# Patient Record
Sex: FEMALE | Race: WHITE | URBAN - METROPOLITAN AREA
[De-identification: names, ages, dates, MRNs, and addresses within clinical notes are randomized per-mention and may not be internally consistent; named-entity substitution may affect disease eponyms.]

---

## 2018-10-02 ENCOUNTER — HOSPITAL ENCOUNTER (OUTPATIENT)
Dept: EMERGENCY MEDICINE | Facility: HOSPITAL | Age: 53
End: 2018-10-03
Attending: INTERNAL MEDICINE | Admitting: INTERNAL MEDICINE

## 2018-10-02 LAB
ABS NEUT (OLG): 4.59 X10(3)/MCL (ref 2.1–9.2)
ALBUMIN SERPL-MCNC: 3.7 GM/DL (ref 3.4–5)
ALBUMIN/GLOB SERPL: 1 RATIO (ref 1–2)
ALP SERPL-CCNC: 88 UNIT/L (ref 45–117)
ALT SERPL-CCNC: 36 UNIT/L (ref 12–78)
APAP SERPL-MCNC: 47.5 MCG/ML (ref 10–30)
AST SERPL-CCNC: 18 UNIT/L (ref 15–37)
BASOPHILS # BLD AUTO: 0.02 X10(3)/MCL
BASOPHILS NFR BLD AUTO: 0 %
BILIRUB SERPL-MCNC: 0.4 MG/DL (ref 0.2–1)
BILIRUBIN DIRECT+TOT PNL SERPL-MCNC: <0.1 MG/DL
BILIRUBIN DIRECT+TOT PNL SERPL-MCNC: ABNORMAL MG/DL
BUN SERPL-MCNC: 10 MG/DL (ref 7–18)
CALCIUM SERPL-MCNC: 8.5 MG/DL (ref 8.5–10.1)
CHLORIDE SERPL-SCNC: 110 MMOL/L (ref 98–107)
CK MB SERPL-MCNC: 1.7 NG/ML (ref 1–3.6)
CK SERPL-CCNC: 151 UNIT/L (ref 26–192)
CO2 SERPL-SCNC: 24 MMOL/L (ref 21–32)
CREAT SERPL-MCNC: 0.8 MG/DL (ref 0.6–1.3)
ERYTHROCYTE [DISTWIDTH] IN BLOOD BY AUTOMATED COUNT: 11.6 % (ref 11.5–14.5)
ETHANOL SERPL-MCNC: <3 MG/DL
GLOBULIN SER-MCNC: 3.3 GM/ML (ref 2.3–3.5)
GLUCOSE SERPL-MCNC: 95 MG/DL (ref 74–106)
HCT VFR BLD AUTO: 35.9 % (ref 35–46)
HGB BLD-MCNC: 12.6 GM/DL (ref 12–16)
IMM GRANULOCYTES # BLD AUTO: 0.02 10*3/UL
IMM GRANULOCYTES NFR BLD AUTO: 0 %
LIPASE SERPL-CCNC: 58 UNIT/L (ref 73–393)
LYMPHOCYTES # BLD AUTO: 3.14 X10(3)/MCL
LYMPHOCYTES NFR BLD AUTO: 38 % (ref 13–40)
MCH RBC QN AUTO: 32.5 PG (ref 26–34)
MCHC RBC AUTO-ENTMCNC: 35.1 GM/DL (ref 31–37)
MCV RBC AUTO: 92.5 FL (ref 80–100)
MONOCYTES # BLD AUTO: 0.48 X10(3)/MCL
MONOCYTES NFR BLD AUTO: 6 % (ref 4–12)
NEUTROPHILS # BLD AUTO: 4.59 X10(3)/MCL
NEUTROPHILS NFR BLD AUTO: 56 X10(3)/MCL
PLATELET # BLD AUTO: 224 X10(3)/MCL (ref 130–400)
PMV BLD AUTO: 10.9 FL (ref 7.4–10.4)
POC TROPONIN: 0 NG/ML (ref 0–0.08)
POTASSIUM SERPL-SCNC: 3 MMOL/L (ref 3.5–5.1)
PROT SERPL-MCNC: 7 GM/DL (ref 6.4–8.2)
RBC # BLD AUTO: 3.88 X10(6)/MCL (ref 4–5.2)
SALICYLATES SERPL-MCNC: 3.1 MG/DL (ref 2.8–20)
SODIUM SERPL-SCNC: 143 MMOL/L (ref 136–145)
TSH SERPL-ACNC: 1.25 MIU/L (ref 0.36–3.74)
WBC # SPEC AUTO: 8.2 X10(3)/MCL (ref 4.5–11)

## 2018-10-03 LAB
ABS NEUT (OLG): 3.04 X10(3)/MCL
ALBUMIN SERPL-MCNC: 2.7 GM/DL (ref 3.4–5)
ALBUMIN/GLOB SERPL: 1 RATIO (ref 1–2)
ALP SERPL-CCNC: 68 UNIT/L (ref 45–117)
ALT SERPL-CCNC: 37 UNIT/L (ref 12–78)
AMPHET UR QL SCN: POSITIVE
APAP SERPL-MCNC: 16.8 MCG/ML (ref 10–30)
APAP SERPL-MCNC: 3.1 MCG/ML (ref 10–30)
APPEARANCE, UA: CLEAR
AST SERPL-CCNC: 15 UNIT/L (ref 15–37)
BACTERIA #/AREA URNS AUTO: ABNORMAL /[HPF]
BARBITURATE SCN PRESENT UR: POSITIVE
BASOPHILS NFR BLD MANUAL: 0 %
BENZODIAZ UR QL SCN: NEGATIVE
BILIRUB SERPL-MCNC: 0.2 MG/DL (ref 0.2–1)
BILIRUB UR QL STRIP: NEGATIVE
BILIRUBIN DIRECT+TOT PNL SERPL-MCNC: <0.1 MG/DL
BILIRUBIN DIRECT+TOT PNL SERPL-MCNC: ABNORMAL MG/DL
BUN SERPL-MCNC: 8 MG/DL (ref 7–18)
CALCIUM SERPL-MCNC: 7.4 MG/DL (ref 8.5–10.1)
CANNABINOIDS UR QL SCN: NEGATIVE
CHLORIDE SERPL-SCNC: 112 MMOL/L (ref 98–107)
CO2 SERPL-SCNC: 22 MMOL/L (ref 21–32)
COCAINE UR QL SCN: NEGATIVE
COLOR UR: ABNORMAL
CREAT SERPL-MCNC: 0.7 MG/DL (ref 0.6–1.3)
EOSINOPHIL NFR BLD MANUAL: 0 %
ERYTHROCYTE [DISTWIDTH] IN BLOOD BY AUTOMATED COUNT: 11.7 % (ref 11.5–14.5)
GLOBULIN SER-MCNC: 2.9 GM/ML (ref 2.3–3.5)
GLUCOSE (UA): NORMAL
GLUCOSE SERPL-MCNC: 90 MG/DL (ref 74–106)
GRANULOCYTES NFR BLD MANUAL: 39 % (ref 43–75)
HCT VFR BLD AUTO: 31.8 % (ref 35–46)
HGB BLD-MCNC: 11.1 GM/DL (ref 12–16)
HGB UR QL STRIP: NEGATIVE
HYALINE CASTS #/AREA URNS LPF: ABNORMAL /[LPF]
KETONES UR QL STRIP: NEGATIVE
LEUKOCYTE ESTERASE UR QL STRIP: 75 LEU/UL
LYMPHOCYTES NFR BLD MANUAL: 3 %
LYMPHOCYTES NFR BLD MANUAL: 52 % (ref 20.5–51.1)
MCH RBC QN AUTO: 32.5 PG (ref 26–34)
MCHC RBC AUTO-ENTMCNC: 34.9 GM/DL (ref 31–37)
MCV RBC AUTO: 93 FL (ref 80–100)
MONOCYTES NFR BLD MANUAL: 1 % (ref 2–9)
NEUTS BAND NFR BLD MANUAL: 5 % (ref 0–10)
NITRITE UR QL STRIP: ABNORMAL
OPIATES UR QL SCN: NEGATIVE
PCP UR QL: NEGATIVE
PH UR STRIP.AUTO: 6 [PH] (ref 5–8)
PH UR STRIP: 6 [PH] (ref 4.5–8)
PLATELET # BLD AUTO: 194 X10(3)/MCL (ref 130–400)
PLATELET # BLD EST: ADEQUATE 10*3/UL
PMV BLD AUTO: 10.9 FL (ref 7.4–10.4)
POTASSIUM SERPL-SCNC: 3.1 MMOL/L (ref 3.5–5.1)
PROT SERPL-MCNC: 5.6 GM/DL (ref 6.4–8.2)
PROT UR QL STRIP: NEGATIVE
RBC # BLD AUTO: 3.42 X10(6)/MCL (ref 4–5.2)
RBC #/AREA URNS AUTO: ABNORMAL /[HPF]
RBC MORPH BLD: NORMAL
SODIUM SERPL-SCNC: 144 MMOL/L (ref 136–145)
SP GR UR STRIP: 1.02 (ref 1–1.03)
SQUAMOUS #/AREA URNS LPF: ABNORMAL /[LPF]
TEMPERATURE, URINE (OHS): 25 DEGC (ref 20–25)
UROBILINOGEN UR STRIP-ACNC: NORMAL
WBC # SPEC AUTO: 7.5 X10(3)/MCL (ref 4.5–11)
WBC #/AREA URNS AUTO: ABNORMAL /HPF

## 2019-02-06 LAB
ABS NEUT (OLG): 4.53 X10(3)/MCL (ref 2.1–9.2)
BASOPHILS # BLD AUTO: 0 X10(3)/MCL (ref 0–0.2)
BASOPHILS NFR BLD AUTO: 0 %
BUN SERPL-MCNC: 10 MG/DL (ref 7–18)
CALCIUM SERPL-MCNC: 9.2 MG/DL (ref 8.5–10.1)
CHLORIDE SERPL-SCNC: 108 MMOL/L (ref 98–107)
CO2 SERPL-SCNC: 29 MMOL/L (ref 21–32)
CREAT SERPL-MCNC: 0.72 MG/DL (ref 0.55–1.02)
CREAT/UREA NIT SERPL: 13.9
ERYTHROCYTE [DISTWIDTH] IN BLOOD BY AUTOMATED COUNT: 11.9 % (ref 11.5–17)
GLUCOSE SERPL-MCNC: 57 MG/DL (ref 74–106)
HCT VFR BLD AUTO: 40.2 % (ref 37–47)
HGB BLD-MCNC: 13.4 GM/DL (ref 12–16)
LYMPHOCYTES # BLD AUTO: 2.8 X10(3)/MCL (ref 0.6–4.6)
LYMPHOCYTES NFR BLD AUTO: 35 %
MCH RBC QN AUTO: 32.1 PG (ref 27–31)
MCHC RBC AUTO-ENTMCNC: 33.3 GM/DL (ref 33–36)
MCV RBC AUTO: 96.4 FL (ref 80–94)
MONOCYTES # BLD AUTO: 0.6 X10(3)/MCL (ref 0.1–1.3)
MONOCYTES NFR BLD AUTO: 8 %
NEUTROPHILS # BLD AUTO: 4.53 X10(3)/MCL (ref 2.1–9.2)
NEUTROPHILS NFR BLD AUTO: 57 %
PLATELET # BLD AUTO: 234 X10(3)/MCL (ref 130–400)
PMV BLD AUTO: 11.5 FL (ref 9.4–12.4)
POTASSIUM SERPL-SCNC: 4.8 MMOL/L (ref 3.5–5.1)
RBC # BLD AUTO: 4.17 X10(6)/MCL (ref 4.2–5.4)
SODIUM SERPL-SCNC: 143 MMOL/L (ref 136–145)
WBC # SPEC AUTO: 8 X10(3)/MCL (ref 4.5–11.5)

## 2019-02-08 ENCOUNTER — HISTORICAL (OUTPATIENT)
Dept: ADMINISTRATIVE | Facility: HOSPITAL | Age: 54
End: 2019-02-08

## 2020-02-09 ENCOUNTER — HOSPITAL ENCOUNTER (OUTPATIENT)
Dept: MEDSURG UNIT | Facility: HOSPITAL | Age: 55
End: 2020-02-12
Attending: INTERNAL MEDICINE | Admitting: INTERNAL MEDICINE

## 2020-02-10 LAB
ABS NEUT (OLG): 8.48 X10(3)/MCL (ref 2.1–9.2)
ALBUMIN SERPL-MCNC: 3.6 GM/DL (ref 3.4–5)
ALBUMIN/GLOB SERPL: 1.1 RATIO (ref 1.1–2)
ALP SERPL-CCNC: 113 UNIT/L (ref 38–126)
ALT SERPL-CCNC: 26 UNIT/L (ref 12–78)
AST SERPL-CCNC: 27 UNIT/L (ref 15–37)
BASOPHILS # BLD AUTO: 0 X10(3)/MCL (ref 0–0.2)
BASOPHILS NFR BLD AUTO: 0 %
BILIRUB SERPL-MCNC: 0.3 MG/DL (ref 0.2–1)
BILIRUBIN DIRECT+TOT PNL SERPL-MCNC: 0.1 MG/DL (ref 0–0.5)
BILIRUBIN DIRECT+TOT PNL SERPL-MCNC: 0.2 MG/DL (ref 0–0.8)
BUN SERPL-MCNC: 11 MG/DL (ref 7–18)
CALCIUM SERPL-MCNC: 8.7 MG/DL (ref 8.5–10.1)
CHLORIDE SERPL-SCNC: 105 MMOL/L (ref 98–107)
CO2 SERPL-SCNC: 28 MMOL/L (ref 21–32)
CREAT SERPL-MCNC: 0.58 MG/DL (ref 0.55–1.02)
ERYTHROCYTE [DISTWIDTH] IN BLOOD BY AUTOMATED COUNT: 12.9 % (ref 11.5–17)
GLOBULIN SER-MCNC: 3.2 GM/DL (ref 2.4–3.5)
GLUCOSE SERPL-MCNC: 74 MG/DL (ref 74–106)
GRAM STN SPEC: NORMAL
HAV IGM SERPL QL IA: NEGATIVE
HBV CORE IGM SERPL QL IA: NEGATIVE
HBV SURFACE AG SERPL QL IA: NEGATIVE
HCT VFR BLD AUTO: 36.6 % (ref 37–47)
HCV AB SERPL QL IA: POSITIVE
HEPATITIS PANEL INTERP: ABNORMAL
HGB BLD-MCNC: 12.3 GM/DL (ref 12–16)
HIV 1+2 AB+HIV1 P24 AG SERPL QL IA: NEGATIVE
LYMPHOCYTES # BLD AUTO: 2.4 X10(3)/MCL (ref 0.6–4.6)
LYMPHOCYTES NFR BLD AUTO: 20 %
MCH RBC QN AUTO: 31.3 PG (ref 27–31)
MCHC RBC AUTO-ENTMCNC: 33.6 GM/DL (ref 33–36)
MCV RBC AUTO: 93.1 FL (ref 80–94)
MONOCYTES # BLD AUTO: 1 X10(3)/MCL (ref 0.1–1.3)
MONOCYTES NFR BLD AUTO: 8 %
NEUTROPHILS # BLD AUTO: 8.48 X10(3)/MCL (ref 2.1–9.2)
NEUTROPHILS NFR BLD AUTO: 71 %
PLATELET # BLD AUTO: 233 X10(3)/MCL (ref 130–400)
PMV BLD AUTO: 10.2 FL (ref 9.4–12.4)
POTASSIUM SERPL-SCNC: 3.7 MMOL/L (ref 3.5–5.1)
PROT SERPL-MCNC: 6.8 GM/DL (ref 6.4–8.2)
RBC # BLD AUTO: 3.93 X10(6)/MCL (ref 4.2–5.4)
SODIUM SERPL-SCNC: 139 MMOL/L (ref 136–145)
WBC # SPEC AUTO: 11.9 X10(3)/MCL (ref 4.5–11.5)

## 2020-02-11 LAB
ABS NEUT (OLG): 6.73 X10(3)/MCL (ref 2.1–9.2)
ALBUMIN SERPL-MCNC: 2.9 GM/DL (ref 3.4–5)
ALBUMIN/GLOB SERPL: 1.2 {RATIO}
ALP SERPL-CCNC: 173 UNIT/L (ref 38–126)
ALT SERPL-CCNC: 246 UNIT/L (ref 12–78)
AMPHET UR QL SCN: POSITIVE
APPEARANCE, UA: CLEAR
AST SERPL-CCNC: 193 UNIT/L (ref 15–37)
BACTERIA SPEC CULT: ABNORMAL /HPF
BARBITURATE SCN PRESENT UR: POSITIVE
BASOPHILS # BLD AUTO: 0 X10(3)/MCL (ref 0–0.2)
BASOPHILS NFR BLD AUTO: 0 %
BENZODIAZ UR QL SCN: NEGATIVE
BILIRUB SERPL-MCNC: 0.5 MG/DL (ref 0.2–1)
BILIRUB UR QL STRIP: NEGATIVE
BILIRUBIN DIRECT+TOT PNL SERPL-MCNC: 0.1 MG/DL (ref 0–0.2)
BILIRUBIN DIRECT+TOT PNL SERPL-MCNC: 0.4 MG/DL (ref 0–0.8)
BUN SERPL-MCNC: 6 MG/DL (ref 7–18)
CALCIUM SERPL-MCNC: 8.1 MG/DL (ref 8.5–10.1)
CANNABINOIDS UR QL SCN: NEGATIVE
CHLORIDE SERPL-SCNC: 108 MMOL/L (ref 98–107)
CO2 SERPL-SCNC: 29 MMOL/L (ref 21–32)
COCAINE UR QL SCN: NEGATIVE
COLOR UR: YELLOW
CREAT SERPL-MCNC: 0.52 MG/DL (ref 0.55–1.02)
ERYTHROCYTE [DISTWIDTH] IN BLOOD BY AUTOMATED COUNT: 13.2 % (ref 11.5–17)
GLOBULIN SER-MCNC: 2.5 GM/DL (ref 2.4–3.5)
GLUCOSE (UA): NEGATIVE
GLUCOSE SERPL-MCNC: 108 MG/DL (ref 74–106)
HCT VFR BLD AUTO: 35.2 % (ref 37–47)
HGB BLD-MCNC: 11.6 GM/DL (ref 12–16)
HGB UR QL STRIP: ABNORMAL
KETONES UR QL STRIP: NEGATIVE
LEUKOCYTE ESTERASE UR QL STRIP: ABNORMAL
LYMPHOCYTES # BLD AUTO: 1.5 X10(3)/MCL (ref 0.6–4.6)
LYMPHOCYTES NFR BLD AUTO: 17 %
MCH RBC QN AUTO: 31.4 PG (ref 27–31)
MCHC RBC AUTO-ENTMCNC: 33 GM/DL (ref 33–36)
MCV RBC AUTO: 95.4 FL (ref 80–94)
MONOCYTES # BLD AUTO: 0.5 X10(3)/MCL (ref 0.1–1.3)
MONOCYTES NFR BLD AUTO: 6 %
NEUTROPHILS # BLD AUTO: 6.73 X10(3)/MCL (ref 2.1–9.2)
NEUTROPHILS NFR BLD AUTO: 77 %
NITRITE UR QL STRIP: NEGATIVE
OPIATES UR QL SCN: POSITIVE
PCP UR QL: NEGATIVE
PH UR STRIP.AUTO: 6 [PH] (ref 5–7.5)
PH UR STRIP: 6 [PH] (ref 5–9)
PLATELET # BLD AUTO: 226 X10(3)/MCL (ref 130–400)
PMV BLD AUTO: 10.6 FL (ref 9.4–12.4)
POTASSIUM SERPL-SCNC: 3.4 MMOL/L (ref 3.5–5.1)
PROT SERPL-MCNC: 5.4 GM/DL (ref 6.4–8.2)
PROT UR QL STRIP: NEGATIVE
RBC # BLD AUTO: 3.69 X10(6)/MCL (ref 4.2–5.4)
RBC #/AREA URNS HPF: 29 /HPF (ref 0–2)
SODIUM SERPL-SCNC: 141 MMOL/L (ref 136–145)
SP GR FLD REFRACTOMETRY: 1.01 (ref 1–1.03)
SP GR UR STRIP: 1.01 (ref 1–1.03)
SQUAMOUS EPITHELIAL, UA: ABNORMAL
UROBILINOGEN UR STRIP-ACNC: 0.2
VANCOMYCIN TROUGH SERPL-MCNC: 8.9 MCG/ML (ref 12–20)
WBC # SPEC AUTO: 8.8 X10(3)/MCL (ref 4.5–11.5)
WBC #/AREA URNS HPF: 139 /HPF (ref 0–3)

## 2020-02-13 LAB
FINAL CULTURE: NO GROWTH
FINAL CULTURE: NORMAL

## 2020-02-15 LAB
FINAL CULTURE: NORMAL
FINAL CULTURE: NORMAL

## 2020-03-09 LAB — FINAL CULTURE: NORMAL

## 2021-08-04 ENCOUNTER — HISTORICAL (OUTPATIENT)
Dept: ADMINISTRATIVE | Facility: HOSPITAL | Age: 56
End: 2021-08-04

## 2022-04-10 ENCOUNTER — HISTORICAL (OUTPATIENT)
Dept: ADMINISTRATIVE | Facility: HOSPITAL | Age: 57
End: 2022-04-10

## 2022-04-29 VITALS
SYSTOLIC BLOOD PRESSURE: 126 MMHG | DIASTOLIC BLOOD PRESSURE: 83 MMHG | HEIGHT: 60 IN | WEIGHT: 105.63 LBS | BODY MASS INDEX: 20.74 KG/M2

## 2022-04-30 NOTE — OP NOTE
DATE OF SURGERY:    02/08/2019    SURGEON:  Oseas Saha MD    PREOPERATIVE DIAGNOSIS:  Neoplasm parotid.    POSTOPERATIVE DIAGNOSIS:  Neoplasm parotid.    PROCEDURE PERFORMED:  Lateral parotidectomy without nerve dissection.    ANESTHESIA:  General endotracheal.    ESTIMATED BLOOD LOSS:  17 mL.    INTRAOPERATIVE MEDICATIONS:  Ancef.    FINDINGS:  Enlarged, purulent lymph node in the posterior tail of parotid with friability and loss of normal architecture grossly.    INDICATIONS:  Mariana Rodriguez is a young lady who has a history of above-mentioned problem.  She understood the risks, benefits, and alternatives to the procedure and consented to it.    DESCRIPTION OF PROCEDURE:  She was brought to the operating room on 02/08, anesthesia was induced in a standard fashion with no complication.  The correct patient, procedure, and site was identified, confirmed with OR personnel.  The operative site was inspected, and the area was prepped and draped in the usual sterile fashion, was injected with 1% lidocaine with epinephrine, and the corner of the mouth was draped in the area so we could watch any activation untoward of the nerve.     The 15 blade was used to make an incision.  A standard modified Emir incision, and the sub SMAS and subplatysmal flaps were raised in a standard fashion.  The lesion was located and dissected free from the surrounding tissue.  Hemostasis was achieved with a bipolar.  The wound was then closed with deep 2-0     Vicryl suture and superficial 5-0 gut on the skin.  The patient was awakened and brought to recovery in good condition.        ______________________________  Oseas Saha MD    CG/UB  DD:  02/08/2019  Time:  08:11AM  DT:  02/08/2019  Time:  12:20PM  Job #:  [id number]

## 2022-04-30 NOTE — ED PROVIDER NOTES
Patient:   Mariana Rodriguez            MRN: [id number]            FIN: [id number]               Age:   52 years     Sex:  Female     :  1965   Associated Diagnoses:   Drug overdose, intentional; Suicidal ideations   Author:   Arjun Gong MD      Basic Information   Time seen: Date & time 10/2/2018 23:00:00.   History source: Patient.   Arrival mode: Private vehicle.   History limitation: None.   Additional information: Chief Complaint from Nursing Triage Note : Chief Complaint   10/2/2018 22:59 CDT      Chief Complaint           Pt. brought in by [facility] for AMS.  Pt. disoriented and confused.  [facility].  Pt. states that she took ambien. Denies any other medication.  (Modified) .      History of Present Illness   The patient presents with intentional overdose and suicide attempt.  The substance ingested was Ambien.  The onset was unknown.  The location where the incident occurred was at home.  The reason for ingestion was suicide attempt.  Risk factors consist of prior suicide attempt.  Prior episodes: occasional.  Therapy today: none.  Associated symptoms: confusion, denies nausea, denies vomiting, denies chest pain and denies shortness of breath.      52-year-old female brought in by ambulance for medical screening evaluation.  EMS reported the patient had been telling all of her neighbors that she was going to commit suicide.  Patient currently difficult to understand, only answering yes or no questions.  Throughout questioning, patient reports that she had taken an unknown amount of Ambien in a suicide attempt.  She denies taking any other medications.  Denies alcohol or drug use.  Currently awake and alert.  EMS reports that there was also an empty bottle of Tylenol that they found at her house.  Patient reports not taking this medication..        Review of Systems   Constitutional symptoms:  No fever, no chills.     Respiratory symptoms:  No shortness of breath, no orthopnea.    Cardiovascular symptoms:  No chest pain, no palpitations.    Gastrointestinal symptoms:  No abdominal pain, no nausea, no vomiting.    Neurologic symptoms:  No headache, no dizziness.              Additional review of systems information: All other systems reviewed and otherwise negative.      Health Status   Allergies:    Allergic Reactions (Selected)  Severity Not Documented  NSAIDs- No reactions were documented.  PROzac- No reactions were documented.  Toradol- No reactions were documented.,    Allergies (3) Active Reaction  NSAIDs None Documented  PROzac None Documented  Toradol None Documented  .   Medications:  (Selected)   Inpatient Medications  Ordered  IVF Normal Saline NS Bolus 1000ml: 1,000 mL, 1,000 mL, IV, 1,000 mL/hr, start date 10/02/18 22:59:00 CDT, stop date 10/02/18 22:59:00 CDT, STAT  Zofran 2 mg/mL injectable solution: 4 mg, form: Injection, IV Push, Now, first dose 10/02/18 23:00:00 CDT, stop date 10/02/18 23:00:00 CDT, STAT  Documented Medications  Documented  Ambien 10 mg oral tab -[facility]: = 1 tab(s), Oral, Once a day (at bedtime), PRN PRN as needed for insomnia, 0 Refill(s)  Amitriptyline Hcl 100mg Tab: 100 mg = 1 tab(s), Oral, Daily  BUT/APAP/CAF TAB: 1 tab(s), Oral, BID  [employer] ACETAMINOPHEN 325 MG TAB: 650 mg = 2 tab(s), Oral, Daily  Cyclobenzaprine Hcl 10mg Tab: 10 mg = 1 tab(s), Oral, TID  Dicyclomine Hcl 10mg Cap: 10 mg = 1 cap(s), Oral, BID  Fioricet oral capsule: 1 cap(s), Oral, q4hr, PRN PRN as needed, # 30 cap(s), 0 Refill(s)  Promethazine 25mg Tab: 25 mg = 1 tab(s), Oral, TID.      Past Medical/ Family/ Social History   Medical history: Reviewed as documented in chart.   Surgical history:    C SEC X 2.  Total abdominal hysterectomy (corpus and cervix), with or without removal of tube(s), with or without removal of ovary(s); (77330)., Reviewed as documented in chart.   Family history:    History is unknown., Reviewed as  documented in chart.   Social history: Reviewed as documented in chart.   Problem list:    Active Problems (5)  Benzodiazepine dependence   Dental decay   Gastric ulcer   Opiate withdrawal   Tobacco user   .      Physical Examination               Vital Signs   Vital Signs   10/2/2018 22:59 CDT      Temperature Oral          36.5 DegC                             Temperature Oral (calculated)             97.70 DegF                             Peripheral Pulse Rate     71 bpm                             Respiratory Rate          18 br/min                             SpO2                      100 %                             Oxygen Therapy            Room air                             Systolic Blood Pressure   119 mmHg                             Diastolic Blood Pressure  63 mmHg  .      Vital Signs (last 24 hrs)_____  Last Charted___________  Temp Oral     36.5 DegC  (OCT 02 22:59)  Heart Rate Peripheral   71 bpm  (OCT 02 22:59)  Resp Rate         18 br/min  (OCT 02 22:59)  SBP      119 mmHg  (OCT 02 22:59)  DBP      63 mmHg  (OCT 02 22:59)  SpO2      100 %  (OCT 02 22:59)  .   Basic Oxygen Information   10/2/2018 22:59 CDT      SpO2                      100 %                             Oxygen Therapy            Room air  .   General:  Alert, no acute distress.    Mainor coma scale:  Total score: Total score: 15.   Neurological:  Alert and oriented to person, place, time, and situation, No focal neurological deficit observed, CN II-XII intact, Patient is alert, in no distress.  She is having difficulty talking, but moves all extremities equally and follows commands.  Patient is only answering yes and no questions currently..    Skin:  Intact, moist.    Head:  Normocephalic, atraumatic.    Neck:  Supple, trachea midline, no tenderness.    Eye:  Pupils are equal, round and reactive to light, extraocular movements are intact, normal conjunctiva.    Ears, nose, mouth and throat:  Oral mucosa moist.   Cardiovascular:   Regular rate and rhythm, No murmur, Normal peripheral perfusion.    Respiratory:  Lungs are clear to auscultation, respirations are non-labored, breath sounds are equal.    Gastrointestinal:  Soft, Nontender, Non distended, Normal bowel sounds.    Psychiatric:  Cooperative, Abnormal / Psychotic thoughts: Suicidal.       Medical Decision Making   Documents reviewed:  Emergency department nurses' notes.   Electrocardiogram:  Time 10/2/2018 23:16:00, rate 58, No ST-T changes, no ectopy, normal AL & QRS intervals, EP Interp, The Rhythm is sinus bradycardia.  .    Results review:  Lab results : Lab View   10/3/2018 0:26 CDT       U pH                      6.0                             UA Appear                 Clear                             UA Color                  LIGHT YELLOW                             UA Spec Grav              1.019                             UA Bili                   Negative                             UA pH                     6.0                             UA Urobilinogen           Normal                             UA Blood                  Negative                             UA Glucose                Normal                             UA Ketones                Negative                             UA Protein                Negative                             UA Nitrite                1+                             UA Leuk Est               75 Ajit/uL                             UA WBC Interp             11-25 /HPF                             UA RBC Interp             0-2                             UA Bact Interp            Many                             UA Squam Epi Interp       2-20                             UA Hyal Cast Interp       3-5                             U Temp                    25.0 DegC                             U Amph Scr                Positive                             U Adilia Scr                Positive                             U Benzodia Scr             Negative                             U Cannab Scr              Negative                             U Cocaine Scr             Negative                             U Opiate Scr              Negative                             U Phencyclidine Scr       Negative    10/2/2018 23:21 CDT      POC Troponin              0.00 ng/mL    10/2/2018 23:20 CDT      Sodium Lvl                143 mmol/L                             Potassium Lvl             3.0 mmol/L  LOW                             Chloride                  110 mmol/L  HI                             CO2                       24 mmol/L                             Calcium Lvl               8.5 mg/dL                             Glucose Lvl               95 mg/dL                             BUN                       10 mg/dL                             Creatinine                0.80 mg/dL                             eGFR-AA                   97 mL/min                             eGFR-KEENAN                  80 mL/min  LOW                             Bili Total                0.4 mg/dL                             Bili Direct               <0.1 mg/dL                             Bili Indirect             unable to calc mg/dL                             AST                       18 unit/L                             ALT                       36 unit/L                             Alk Phos                  88 unit/L                             Total Protein             7.0 gm/dL                             Albumin Lvl               3.7 gm/dL                             Globulin                  3.30 gm/mL                             A/G Ratio                 1 ratio                             Lipase Lvl                58 unit/L  LOW                             Total CK                  151 unit/L                             CK MB                     1.7 ng/mL                             WBC                       8.2 x10(3)/mcL                             RBC                        3.88 x10(6)/mcL  LOW                             Hgb                       12.6 gm/dL                             Hct                       35.9 %                             Platelet                  224 x10(3)/mcL                             MCV                       92.5 fL                             MCH                       32.5 pg                             MCHC                      35.1 gm/dL                             RDW                       11.6 %                             MPV                       10.9 fL  HI                             Abs Neut                  4.59 x10(3)/mcL                             Neutro Auto               56 x10(3)/mcL  NA                             Lymph Auto                38 %                             Mono Auto                 6 %                             Basophil Auto             0  NA                             Abs Neutro                4.59 x10(3)/mcL  NA                             Abs Lymph                 3.14 x10(3)/mcL  NA                             Abs Mono                  0.48 x10(3)/mcL  NA                             Abs Baso                  0.02 x10(3)/mcL  NA                             IG%                       0 %  NA                             IG#                       0.0200  NA                             Acetaminoph Lvl           47.5 mcg/mL  HI                             Salicylate Lvl            3.1 mg/dL                             Ethanol Lvl               <3.0 mg/dL  .      Reexamination/ Reevaluation   Time: 10/3/2018 00:59:00 .   Notes: Patient currently has akathisia, and has decreased responsiveness though will answer yes and no questions.  These findings may be seen with Ambien overdose.  We'll give a dose of Cogentin to see if this helps with symptoms.  Patient's Tylenol level also was elevated.  Unsure of when the patient actually took the Tylenol.  We'll repeat a 4 hour Tylenol dose.  Due to these multiple overdose  symptoms, internal medicine will be consulted for overnight observation in ICU.  Patient has been placed under physicians emergency certificate due to suicidal ideations.  Because of the patient's frequent movement and pulling at her IV, medical restraints have been initiated.    Due to concerns of aspiration, N-acetylcysteine will be given as a loading dose of 150mg/kg (up to 15g) over 1 hr  - dose is 9000mg over the 1st hour - and then try to continue oral if patient is able to tolerates.    Poison control has been contacted, and has been following the patient.  They also recommend giving a loading dose of n-acetylcysteine while awaiting for acetaminophen level redraw at 4 hrs..      Impression and Plan   Diagnosis   Drug overdose, intentional (CPY54-[place] T50.902A)   Suicidal ideations (IPB51-[place] R45.851)      Calls-Consults   -  10/3/2018 01:18:00 , Internal medicine, consult.    Plan   Condition: Stable.    Disposition: Admit time  10/3/2018 01:18:00, Admit to Intensive Care Unit.    Counseled: Patient, Regarding diagnosis, Regarding diagnostic results, Regarding treatment plan, Patient indicated understanding of instructions.

## 2022-04-30 NOTE — CONSULTS
Patient:   Mariana Rodriguez            MRN: [id number]            FIN: [id number]               Age:   54 years     Sex:  Female     :  1965   Associated Diagnoses:   None   Author:   Mari Duong      History of Present Illness   DATE OF CONSULTATION:  02/10/2020     ATTENDING PHYSICIAN:  Jeremie Duarte MD  CONSULTING PHYSICIAN:  Niall Bhakta MD     CONSULTATION DIAGNOSIS: Left index finger abscess     CHIEF COMPLAINT: Left index finger pain and swelling     HISTORY OF PRESENT ILLNESS:  53 y/o female well known to me with history of anxiety, bipolar, substance abuse, opioid use disorder, h/o detox stay, 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 continues to loss weight and becoming cachectic at this time. Orthopedics was consulted for evaluation and management of left index finger abscess.      REVIEW OF SYSTEMS:  Constitutional: negative except as stated in HPI  HEENT: negative except as stated in HPI  Respiratory: negative except as stated in HPI  Cardiovascular: negative except as stated in HPI  Gastrointestinal: negative except as stated in HPI  Genitourinary: negative except as stated in HPI  Heme/Lymph: negative except as stated in HPI  Musculoskeletal: negative except as stated in HPI  Integumentary: negative except as stated in HPI  Neurologic: negative except as stated in HPI     All Other ROS_ negative except as stated in HPI     PAST MEDICAL HISTORY:   anxiety, bipolar, substance abuse, opioid use disorder, h/o detox stay, h/o neoplasm of left parotid     PAST SURGICAL HISTORY:   left parotidectomy, Cholecystectomy, Tonsillectomy with adenoidectomy     SOCIAL  HISTORY: Substance abuse, opioid user      FAMILY HISTORY: Unremarkable     PHYSICAL EXAMINATION:  GENERAL:   is in no apparent distress.   is awake, alert, and oriented.   HEAD, EYES, EARS, NOSE, AND THROAT:   extraocular movements are intact.   is normocephalic, atraumatic.   PULMONARY:   has unlabored respirations.    CARDIOVASCULAR:   has normal rate, regular rhythm.   has normal peripheral perfusion   GI:   abdomen is soft, nontender, nondistended, and obese.   INTEGUMENTARY SYSTEM:  skin dry, pink, intact   MUSCULOSKELETAL: LUE-Left index finger with erythema and swelling. Warm to the touch. Decreased ROM of index finger due to pain and swelling. Good ROM of wrist and other digits. AIN/PIN/U motor intact. BCR all digits. RP 2+     X-RAY: X-ray pending     PLAN: 54 year old female with a left index finger abscess. Discussed with patient at length surgical intervention. The risks, benefits and alternatives to operative intervention were discussed with the patient today including but not limited to pain, bleeding, scarring, stiffness, infection, damage to neurovascular structures, malunion/nonunion, need for future procedures and complications leading to amputation and even death. Patient verbalized understanding and wishes to proceed with surgical intervention Plan is to take patient to the OR today for irrigation and debridement of left index finger. Keep NPO.

## 2022-04-30 NOTE — H&P
Patient:   Mariana Rodriguez            MRN: [id number]            FIN: [id number]               Age:   54 years     Sex:  Female     :  1965   Associated Diagnoses:   None   Author:   Felicia MARTELL, Jeremie      Basic Information   Source of history:  Self, Medical record.    Present at bedside:  Medical personnel.    Referral source:  Emergency department.    History limitation:  None.       Chief Complaint   2020 22:56 CST       left index pain, atraumatic.      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 continues to losse weight and becoming cachectic at this time      Review of Systems   Constitutional:  Weakness.    Eye:  Negative.    Ear/Nose/Mouth/Throat:  Negative.    Respiratory:  Negative.    Cardiovascular:  Negative.    Gastrointestinal:  Negative.    Genitourinary:  Negative.    Hematology/Lymphatics:  Negative.    Endocrine:  Negative.    Immunologic:  Negative.    Musculoskeletal:  Neck pain, Decreased range of motion.    Integumentary:  Negative.    Neurologic:  Alert and oriented X4, Abnormal balance, Numbness, Tingling, Headache.    Psychiatric:  Negative.    All other systems are negative      Health Status   Allergies:    Allergic Reactions (Selected)  Severity Not Documented  NSAIDs- Stomach ulcers.  PROzac- Hallucinations.  Toradol- Hives.,    Allergies (3) Active Reaction  NSAIDs stomach ulcers  PROzac hallucinations  Toradol hives     Current medications:  (Selected)   Inpatient Medications  Ordered  Norco 5 mg-325 mg oral  tablet: 1 tab(s), form: Tab, Oral, q6hr PRN for pain, first dose 02/10/20 1:20:00 CST, mild-moderate  Rocephin (for IVPB): 1,000 mg, IV Piggyback, q24hr, Infuse over: 30 minute(s), first dose 02/10/20 2:00:00 CST  Sodium Chloride 0.9% intravenous solution 1,000 mL: 1,000 mL, 1,000 mL, IV, 75 mL/hr, start date 02/10/20 1:20:00 CST, 1.36, m2  Vancomycin (for IVPB): 1,250 mg, IV Piggyback, q24hr, Infuse over: 2 hr, first dose 02/10/20 1:20:00 CST, STAT  acetaminophen: 650 mg, form: Liquid, Oral, q6hr PRN for fever, first dose 02/10/20 1:20:00 CST,  > 38.1 degrees Celsius (100.6 degrees Fahrenheit)  Documented Medications  Documented  BUSPIRONE 15MG TABLETS: 15 mg = 1 tab(s), BID  HYDROXYZINE PAMOATE 25 MG CAPS: 25 mg = 1 cap(s), TID  MIRTAZAPINE 30 MG TABS: 30 mg = 1 tab(s), qPM  acetaminophen-hydrocodone 325 mg-5 mg oral tablet: 1 tab(s), Oral, q6hr  acetaminophen/butalbital/caffeine 325 mg-50 mg-40 mg oral tablet: 1 tab(s), Oral, BID  amitriptyline 100 mg oral tablet: 100 mg = 1 tab(s), Oral, Once a day (at bedtime)  clindamycin 150 mg oral capsule: 300 mg = 2 cap(s), Oral, TID  zolpidem 10 mg oral tablet: 10 mg = 1 tab(s), Oral, qPM,    Medications (5) Active  Scheduled: (2)  cefTRIAXone  1,000 mg 1 EA, IV Piggyback, q24hr  vancomycin  1,250 mg 5 EA, IV Piggyback, q24hr  Continuous: (1)  sodium chloride 0.9% 1,000 mL  1,000 mL, IV, 75 mL/hr  PRN: (2)  acetaminophen 650 mg/20.3mL Liqu Adult UD  650 mg 20.3 mL, Oral, q6hr  hydrocodone 5mg/APAP 325 mg  1 tab(s), Oral, q6hr     Problem list:    All Problems  Anxiety / SNOMED CT 54805237 / Confirmed  Benzodiazepine dependence / SNOMED CT [id number] / Confirmed  Dental decay / SNOMED CT 502842083 / Confirmed  Gastric ulcer / SNOMED CT [id number] / Confirmed  Knowledge deficit / SNOMED CT 4663916326 / Confirmed  Neoplasm of parotid gland / SNOMED CT 346855736 / Confirmed  Opiate withdrawal / SNOMED CT  [id number] / Confirmed  Tobacco user / SNOMED CT 396781584 / Probable,    Active Problems (8)  Anxiety   Benzodiazepine dependence   Dental decay   Gastric ulcer   Knowledge deficit   Neoplasm of parotid gland   Opiate withdrawal   Tobacco user         Histories   Family History:    History is unknown.   Procedure history:    Parotidectomy (.) performed by Oseas Saha MD on 2/8/2019 at 53 Years.  Comments:  2/8/2019 8:21 SOPHIE - Coni LUNSFORD, Susan DE LA GARZA  auto-populated from documented surgical case  C SEC X 2.  Total abdominal hysterectomy (corpus and cervix), with or without removal of tube(s), with or without removal of ovary(s); (CPT4 06598).  Cholecystectomy (SNOMED CT 03305229).  Colonoscopy (SNOMED CT 624182412).  Tonsillectomy with adenoidectomy (SNOMED CT 62601721).   Social History        Social & Psychosocial Habits    Alcohol  09/25/2012 Risk Assessment: Denies Alcohol Use    11/25/2018  Use: Past    Home/Environment  10/27/2018  Lives with: Alone    Substance Use  09/25/2012 Risk Assessment: Denies Substance Abuse    02/06/2019  Use: Past    Type: Marijuana, OPIATES AND BENZO'S, Prescription medications    Frequency: Several times per day    Previous treatment: Inpatient, Outpatient, Treatment center    Started at age: 46 Years    IV drug use: Yes    Has drug use interfered with your work or home life? Yes    Ready to change: Yes    Concerns about substance abuse in household: No    Tobacco  09/25/2012 Risk Assessment: Denies Tobacco Use    11/25/2018  Use: Current every day smoker    Patient Wants Consult For Cessation Counseling Yes    02/08/2019  Use: 5-9 cigarettes (between 1    Type: Cigarettes    Patient Wants Consult For Cessation Counseling Yes    Tobacco use per day: 10    Started at age: 26 Years    05/16/2019  Use: 10 or more cigarettes (1/    Patient Wants Consult For Cessation Counseling N/A    09/22/2019  Use: Smoker, current status un    Patient Wants Consult  For Cessation Counseling No    12/04/2019  Use: 5-9 cigarettes (between 1    Type: Cigarettes    Patient Wants Consult For Cessation Counseling No    02/10/2020  Use: 5-9 cigarettes (between 1    Type: Cigarettes    Patient Wants Consult For Cessation Counseling No    Tobacco use per day: 4    Concerns about tobacco use in household: No    Abuse/Neglect  12/04/2019  SHX Any signs of abuse or neglect No    02/10/2020  SHX Any signs of abuse or neglect No    Feels unsafe at home: No    Safe place to go: Yes  .        Physical Examination   General:  Alert and oriented.    Eye:  Pupils are equal, round and reactive to light, Extraocular movements are intact, Normal conjunctiva, Vision unchanged.    HENT:  Normocephalic, Normal hearing, Oral mucosa is moist, No pharyngeal erythema.    Neck:  Supple, Non-tender, No carotid bruit.    Respiratory:  Lungs are clear to auscultation, Respirations are non-labored, Breath sounds are equal, No chest wall tenderness.    Cardiovascular:  Normal rate, Regular rhythm, No murmur, No gallop.    Gastrointestinal:  Soft, Non-tender, Non-distended, Normal bowel sounds, No organomegaly.    Genitourinary:  No costovertebral angle tenderness, No inguinal tenderness, No urethral discharge.       Vital Signs (last 24 hrs)_____  Last Charted___________  Temp Oral     36.9 DegC  (FEB 10 03:49)  Heart Rate Peripheral   73 bpm  (FEB 10 03:49)  Resp Rate         18 br/min  (FEB 09 22:56)  SBP      126 mmHg  (FEB 10 03:49)  DBP      82 mmHg  (FEB 10 03:49)  SpO2      99 %  (FEB 10 03:49)  Weight      43 kg  (FEB 10 02:15)  Height      152 cm  (FEB 10 02:15)  BMI      18.61  (FEB 10 02:15)     Lymphatics:  No lymphadenopathy neck, axilla, groin.    Musculoskeletal:  Normal range of motion, Normal strength, left hand swollen and erythematous.    Neurologic:  Alert, Oriented, Normal sensory, Normal motor function, No focal deficits, Cranial Nerves II-XII are grossly intact.    Psychiatric:   Cooperative, Appropriate mood & affect, Normal judgment, Non-suicidal.       Review / Management   Results review:     Labs (Last four charted values)  WBC                  H 11.9 (FEB 10)   Hgb                  12.3 (FEB 10)   Hct                  L 36.6 (FEB 10)   Plt                  233 (FEB 10)   Na                   139 (FEB 10)   K                    3.7 (FEB 10)   CO2                  28.0 (FEB 10)   Cl                   105 (FEB 10)   Cr                   0.58 (FEB 10)   BUN                  11.0 (FEB 10)   Glucose Random       74 (FEB 10) .       Impression and Plan   Left hand cellulitis with tenosynovitis  encephalopathy  bipolar  anxiety  h/o substance abuse  cachexia  significant weight loss with personality changes  worthins tumour s/p recent parotidectomy left    plan :  ivf  iv abx  will check ua, uds, hiv and hep panel  resume home meds  ortho reccs  follow cx  labs in am  scds for dvt ppx  adm time 71 min  code status full

## 2022-04-30 NOTE — H&P
Date of procedure is 2019.    PREOP DIAGNOSIS:  Neoplasm left parotid.     Ms. Mariana Rodriguez is a 53-year-old lady who has a history of left-sided swelling in the posterior tail of parotid, which has gone up and down in size.  It is not painful, it never drains, and it has been there for several years.    ALLERGIES:  No known drug allergies.    PAST SURGICAL HISTORY:  Endoscopic sinus surgery x2, , hysterectomy.    CURRENT MEDICATIONS:  Elavil.    PAST MEDICAL HISTORY:  Chronic headaches.    SOCIAL HISTORY:  She is a half pack a day smoker for 20 years and nondrinker.    REVIEW OF SYSTEMS:  CARDIOVASCULAR:  No chest pain or circulation problems.   PULMONARY:  No shortness of breath or cough.   SKIN:  Scalp, no suspicious lesions or capillary hemangiomas.   GENERAL:  No bleeding disorders, numbness, sweats.    FAMILY HISTORY:  Negative for tuberculosis or bleeding disorders.    PHYSICAL EXAM:  NECK:  No masses except for the left tail of parotid, which has a 1.5 x 3 x 2 cm firm area contiguous with the parotid gland.  This is nontender and there is no connection with the overlying skin.   MOUTH:  Oral cavity and oropharynx normal mucosa.   EYES:  Extraocular motions intact.   NEUROLOGIC:  Cranial nerves 2-12 grossly intact.   NASAL:  Normal nasal mucosa.    ASSESSMENT:  Left parotid neoplasm.    PLAN:  Superficial parotidectomy with nerve dissection.  We discussed risks, benefits, and alternatives at length in layman's terms.  We discussed the rare but serious things that could occur, as well as more common complications and expectations.  Consent obtained.  All questions answered.  Proceed on .        ______________________________  Oseas Saha MD    CG/[facility]  DD:  2019  Time:  12:08PM  DT:  2019  Time:  12:31PM  Job #:  [id number]    The H&P was reviewed, the patient was examined, and the following changes to the patients condition are  noted:  ______________________________________________________________________________  ______________________________________________________________________________  ______________________________________________________________________________  [  ] No changes to the patient's condition:      ______________________________                                             ___________________  PHYSICIAN SIGNATURE                                                             DATE/TIME

## 2022-04-30 NOTE — H&P
Patient:   Mariana Rodriguez            MRN: [id number]            FIN: [id number]               Age:   52 years     Sex:  Female     :  1965   Associated Diagnoses:   Drug overdose, intentional; Suicidal ideations; Akathisia   Author:   Carey Cortez MD      Basic Information   Admit information:  Team 1B: Enrique Pugh MD .    Source of history:  Medical record, EMT.    Referral source:  Emergency department.    History limitation:  Clinical condition.       Chief Complaint   10/2/2018 22:59 CDT      Pt. brought in by [facility] for AMS.  Pt. disoriented and confused.  [facility] prior to this episode.  Pt. states that she took ambien. Denies any other medication.  (Modified)      History of Present Illness   52-year-old  female was brought into the emergency room via EMS.  Per the ED staff the patient was disoriented and confused at the time.  Apparently she had been reporting to her neighbors at her apartment complex that she was suicidal and she was going to kill herself.  She was found down and unresponsive by EMS.  By her body they found an empty bottle of Tylenol.  When she was brought to the emergency room she was somnolent and difficult to arouse but maintaining her airway.  When you speak to her, her speech is somewhat incoherent and does not answer appropriately.  One of the nurses managed to ask her what she had taken to which she responded Ambien.  At present she is being treated with Cogentin for akathisia.  She has been started on IV fluids as well as N-acetylcysteine-with a loading dose of 150 mg/kg.  Poison control was contacted and advised treatment at present is appropriate.  Because the patient is unable to elaborate on what she overdosed on, and she is so somnolent, ED consulted medicine on call for admission to ICU to watch the patient for her Tylenol as well as Ambien overdose.    Past medical history  Seizures due to Xanax  withdrawals, peptic ulcer disease, sciatica, history of psychiatric hospital admission after overdosing on opioids.  History of depression.  Past surgical history   x2, sinus surgery x2, PEPE  Family Hx  Mother -   - DM, heart disease  Father -  - none  Sister -  - DM, HTN  Brother - living - addictions  Social Hx-   Education: 3 years of college  Occupation: Unemployed  Marital Status:  ×2 and  ×2  Children: 2 adult children           Review of Systems   Unable to obtain:  Due to clinical condition, Due to altered mental status.       Health Status   Allergies:    Allergic Reactions (Selected)  Severity Not Documented  NSAIDs- No reactions were documented.  PROzac- No reactions were documented.  Toradol- No reactions were documented.   Current medications:  (Selected)   Inpatient Medications  Ordered  Dextrose 5% in Water intravenous solution 200 mL + Acetadote for INFUSION 9,000 m mL, 200 mL, IV, 245 mL/hr, order duration: 1 dose(s), start date 10/03/18 1:15:00 CDT, stop date 10/03/18 2:14:00 CDT  Lactated Ringers 1000ml 1,000 mL: 1,000 mL, 1,000 mL, IV, 1,000 mL/hr, start date 10/03/18 1:19:00 CDT  Lovenox: 40 mg, form: Injection, Subcutaneous, Daily, first dose 10/03/18 1:48:00 CDT, STAT  Protonix: 40 mg, form: Injection, IV Slow, Daily, first dose 10/03/18 1:48:00 CDT, STAT  Zofran: 4 mg, form: Injection, IV Push, q6hr PRN for nausea/vomiting, first dose 10/03/18 1:48:00 CDT, STAT  Documented Medications  Documented  Ambien 10 mg oral tab -[facility]: = 1 tab(s), Oral, Once a day (at bedtime), PRN PRN as needed for insomnia, 0 Refill(s)  Amitriptyline Hcl 100mg Tab: 100 mg = 1 tab(s), Oral, Daily  BUT/APAP/CAF TAB: 1 tab(s), Oral, BID  [employer] ACETAMINOPHEN 325 MG TAB: 650 mg = 2 tab(s), Oral, Daily  Cyclobenzaprine Hcl 10mg Tab: 10 mg = 1 tab(s), Oral, TID  Dicyclomine Hcl 10mg Cap: 10 mg = 1 cap(s), Oral, BID  Fioricet oral capsule: 1 cap(s), Oral, q4hr, PRN PRN as  needed, # 30 cap(s), 0 Refill(s)  Promethazine 25mg Tab: 25 mg = 1 tab(s), Oral, TID,    Medications (5) Active  Scheduled: (2)  enoxaparin 40mg/0.4ml Inj  40 mg 0.4 mL, Subcutaneous, Daily  pantoprazole  Inj  40 mg 1 EA, IV Slow, Daily  Continuous: (2)  D5W 200 mL + acetylcysteine 9,000 mg  200 mL, IV, 245 mL/hr  lactated ringers 1,000 mL  1,000 mL, IV, 1,000 mL/hr  PRN: (1)  ondansetron 2 mg/mL inj - 2mL  4 mg 2 mL, IV Push, q6hr     Problem list:    Active Problems (5)  Benzodiazepine dependence   Dental decay   Gastric ulcer   Opiate withdrawal   Tobacco user         Physical Examination      Vital Signs (last 24 hrs)_____  Last Charted___________  Temp Oral     36.5 DegC  (OCT 03 01:16)  Heart Rate Peripheral   62 bpm  (OCT 03 01:30)  Resp Rate         22 br/min  (OCT 03 01:30)  SBP      99 mmHg  (OCT 03 01:30)  DBP      69 mmHg  (OCT 03 01:30)  SpO2      100 %  (OCT 03 01:30)     General: The patient is in her bed asleep.  She is arousable but has difficulty maintaining consciousness.  She mumbles answers that are incoherent.  Respiratory: She is stable on room air saturating 100%, lungs are clear to auscultation bilaterally there is no wheezing or crackles present  Cardiovascular: Regular rate and rhythm, no murmur appreciated, no edema  Abdomen: Soft nontender hypoactive bowel sounds  Skin: No rashes or breakdown or lacerations appreciated  Psychiatric: Suicidal, altered mental status, poor insight  Neurologic: She is not alert and she is not oriented, she moves all of her extremities, pupils are equal and reactive to light bilaterally      Review / Management   Results review:  All Results   10/3/2018 0:26 CDT       UA Appear                 Clear                             UA Color                  LIGHT YELLOW                             UA Spec Grav              1.019                             UA Bili                   Negative                             UA pH                     6.0                              UA Urobilinogen           Normal                             UA Blood                  Negative                             UA Glucose                Normal                             UA Ketones                Negative                             UA Protein                Negative                             UA Nitrite                1+                             UA Leuk Est               75 Ajit/uL                             UA WBC Interp             11-25 /HPF                             UA RBC Interp             0-2                             UA Bact Interp            Many                             UA Squam Epi Interp       2-20                             UA Hyal Cast Interp       3-5                             U Temp                    25.0 DegC                             U pH                      6.0                             U Amph Scr                Positive                             U Adilia Scr                Positive                             U Benzodia Scr            Negative                             U Cannab Scr              Negative                             U Cocaine Scr             Negative                             U Opiate Scr              Negative                             U Phencyclidine Scr       Negative    10/2/2018 23:21 CDT      POC Troponin              0.00 ng/mL    10/2/2018 23:20 CDT      WBC                       8.2 x10(3)/mcL                             RBC                       3.88 x10(6)/mcL  LOW                             Hgb                       12.6 gm/dL                             Hct                       35.9 %                             Platelet                  224 x10(3)/mcL                             MCV                       92.5 fL                             MCH                       32.5 pg                             MCHC                      35.1 gm/dL                             RDW                       11.6 %                              MPV                       10.9 fL  HI                             Abs Neut                  4.59 x10(3)/mcL                             Neutro Auto               56 x10(3)/mcL  NA                             Lymph Auto                38 %                             Mono Auto                 6 %                             Basophil Auto             0  NA                             Abs Neutro                4.59 x10(3)/mcL  NA                             Abs Lymph                 3.14 x10(3)/mcL  NA                             Abs Mono                  0.48 x10(3)/mcL  NA                             Abs Baso                  0.02 x10(3)/mcL  NA                             IG%                       0 %  NA                             IG#                       0.0200  NA                             Sodium Lvl                143 mmol/L                             Potassium Lvl             3.0 mmol/L  LOW                             Chloride                  110 mmol/L  HI                             CO2                       24 mmol/L                             Calcium Lvl               8.5 mg/dL                             Glucose Lvl               95 mg/dL                             BUN                       10 mg/dL                             Creatinine                0.80 mg/dL                             eGFR-AA                   97 mL/min                             eGFR-KEENAN                  80 mL/min  LOW                             Bili Total                0.4 mg/dL                             Bili Direct               <0.1 mg/dL                             Bili Indirect             unable to calc mg/dL                             AST                       18 unit/L                             ALT                       36 unit/L                             Alk Phos                  88 unit/L                             Total Protein             7.0 gm/dL                             Albumin Lvl                3.7 gm/dL                             Globulin                  3.30 gm/mL                             A/G Ratio                 1 ratio                             Lipase Lvl                58 unit/L  LOW                             Total CK                  151 unit/L                             CK MB                     1.7 ng/mL                             Acetaminoph Lvl           47.5 mcg/mL  HI                             Salicylate Lvl            3.1 mg/dL                             Ethanol Lvl               <3.0 mg/dL    10/2/2018 23:20 CDT      TSH                       1.250 mIU/L    .    ED Interp: Electrocardiogram:  Time 10/2/2018 23:16:00, rate 58, No ST-T changes, no ectopy, normal CO & QRS intervals, EP Interp, The Rhythm is sinus bradycardia.  .           Impression and Plan   Diagnosis     Drug overdose, intentional (DNU62-KD T50.902A).     Suicidal ideations (XJI13-PA R45.851).     Akathisia (ALU59-KW G25.71).     1.  Intentional drug overdose  Unclear if Tylenol or Ambien or both  Patient has been loaded with 150 mg of N- acetylcysteine   Patient has repeat Tylenol level scheduled for 3:30 AM.  I have discussed with the ER staff the fact that it is difficult to quantify on the normal gram where her Tylenol level and treatment should be initiated.  For this reason Poison control was reached and reported that current treatment is appropriate.  If her Tylenol level remains elevated with her morning labs then would recommend continuing with the 50 mg/kg dose of N- acetylcysteine over a 4-hour period.  We will continue with IV fluid hydration.  I have made her n.p.o. until she clears and can pass a swallow test.  CT of the head is pending formal read.   2. suicidal ideation  Patient has been PEC.  Will need to be medically cleared prior to transfer to psychiatric facility at which time case management can be consulted for placement.  3.  Akathisia  Patient was given 1 dose of Cogentin in  the ER and seems to be responding appropriately.  If this persists or resumes would consider continuing the Cogentin but I will not order it at this time unless she becomes symptomatic.  Continue with soft restraints as we are trying to avoid the use of benzodiazepines for akathisia.  4.  Polysubstance abuse  Patient urine drug screen was positive for barbiturates which is consistent with the Fioricet that she has and Dr. first.  Also positive for amphetamines.  5.  Hypokalemia  We will add potassium to patient fluids  6.  Possible urinary tract infection  Difficult to ascertain if the patient is symptomatic or not since she is not communicating.  Will defer to primary team to decide if they want to treat.  Of note I considered Rocephin but it is contraindicated in anybody with the potential for renal or hepatic impairment.       Dispo: Because we are unable to quantify what and how much she took to overdose, and the patient is somnolent and borderline bradycardic, the patient is at high risk for acute respiratory failure.  She therefore requires monitoring in the ICU.

## 2022-04-30 NOTE — DISCHARGE SUMMARY
Patient:   Mariana Rodriguez            MRN: [id number]            FIN: [id number]               Age:   54 years     Sex:  Female     :  1965   Associated Diagnoses:   None   Author:   Jeremie Duarte MD      Discharge Information      Discharge Summary Information   Admitted  2020   Discharged  2020   Admitting physician     Jeremie Duarte MD.        Physical Examination   General:  Alert and oriented.    Eye:  Pupils are equal, round and reactive to light, Extraocular movements are intact, Normal conjunctiva, Vision unchanged.    HENT:  Normocephalic, Normal hearing, Oral mucosa is moist, No pharyngeal erythema.    Neck:  Supple, Non-tender, No carotid bruit.    Respiratory:  Lungs are clear to auscultation, Respirations are non-labored, Breath sounds are equal, No chest wall tenderness.    Cardiovascular:  Normal rate, Regular rhythm, No murmur, No gallop.    Gastrointestinal:  Soft, Non-tender, Non-distended, Normal bowel sounds, No organomegaly.    Genitourinary:  No costovertebral angle tenderness, No inguinal tenderness, No urethral discharge.       Vital Signs (last 24 hrs)_____  Last Charted___________  Temp Oral     36.8 DegC  ( 07:44)  Heart Rate Peripheral   69 bpm  (:44)  Resp Rate         20 br/min  ( 20:00)  SBP      126 mmHg  ( 07:44)  DBP      83 mmHg  ( 07:44)  SpO2      99 %  ( 07:44)     Lymphatics:  No lymphadenopathy neck, axilla, groin.    Musculoskeletal:  Normal range of motion, Normal strength.    Neurologic:  Alert, Oriented, Normal sensory, Normal motor function, No focal deficits, Cranial Nerves II-XII are grossly intact.    Psychiatric:  Cooperative, Appropriate mood & affect, Normal judgment, Non-suicidal.       Hospital Course   Basic Information   Source of history:  Self, Medical record.    Present at bedside:  Medical personnel.    Referral source:  Emergency department.    History limitation:  None.       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 continues to losse weight and becoming cachectic at this time    today's info : seen and examined, s/p i and d yesterday  hep c pos  blood pos for gram pos cocci     IMPression and Plan   Left hand cellulitis with tenosynovitis  encephalopathy  bipolar  anxiety  h/o substance abuse  cachexia  significant weight loss with personality changes  worthins tumour s/p recent parotidectomy left    plan :  ivf  iv abx  will check ua, uds, hiv and hep panel  resume home meds  ortho reccs  follow cx  labs in am  scds for dvt ppx      dispo : continue iv abx  CX NEG  DC HOME ON PO ABX  DC TIME 32 MIN

## 2022-04-30 NOTE — ED PROVIDER NOTES
Patient:   Mariana Rodriguez            MRN: [id number]            FIN: [id number]               Age:   54 years     Sex:  Female     :  1965   Associated Diagnoses:   Tenosynovitis of fingers   Author:   David Recinos      Basic Information   Time seen: Date & time 2/10/2020 00:28:00.   History source: Patient.   Arrival mode: Private vehicle.   History limitation: None.   Additional information: Chief Complaint from Nursing Triage Note : Chief Complaint   2020 22:56 CST       Chief Complaint           left index pain, atraumatic.  .      History of Present Illness   The patient presents with left, finger swelling.  The onset was 2  days ago.  The course/duration of symptoms is constant.  Type of injury: none.  The location where the incident occurred was at home.  Location: Left index finger. The character of symptoms is pain, redness and swelling.  The degree of pain is moderate.  The degree of swelling is moderate.  The exacerbating factor is none.  The relieving factor is none.  Risk factors consist of none.  Prior episodes: none.  Therapy today: none.  Associated symptoms: none.        Review of Systems   Constitutional symptoms:  No fever, no chills.    Respiratory symptoms:  No shortness of breath, no cough.    Cardiovascular symptoms:  No chest pain, no palpitations.    Gastrointestinal symptoms:  No vomiting, no diarrhea.    Musculoskeletal symptoms:  No back pain,    Neurologic symptoms:  No altered level of consciousness, no weakness.              Additional review of systems information: All other systems reviewed and otherwise negative.      Health Status   Allergies:    Allergic Reactions (Selected)  Severity Not Documented  NSAIDs- Stomach ulcers.  PROzac- Hallucinations.  Toradol- Hives..   Medications:  (Selected)   Documented Medications  Documented  BUSPIRONE 15MG TABLETS: 15 mg = 1 tab(s), BID  HYDROXYZINE PAMOATE 25 MG CAPS: 25 mg = 1 cap(s), TID  MIRTAZAPINE 30 MG  TABS: 30 mg = 1 tab(s), qPM  acetaminophen-hydrocodone 325 mg-5 mg oral tablet: 1 tab(s), Oral, q6hr  acetaminophen/butalbital/caffeine 325 mg-50 mg-40 mg oral tablet: 1 tab(s), Oral, BID  amitriptyline 100 mg oral tablet: 100 mg = 1 tab(s), Oral, Once a day (at bedtime)  clindamycin 150 mg oral capsule: 300 mg = 2 cap(s), Oral, TID  zolpidem 10 mg oral tablet: 10 mg = 1 tab(s), Oral, qPM, per nurse's notes.   Immunizations: Per nurse's notes.   Menstrual history: Per nurse's notes.      Past Medical/ Family/ Social History   Medical history: Reviewed as documented in chart.   Surgical history:    Parotidectomy (.) on 2/8/2019 at 53 Years.  Comments:  2/8/2019 8:21 CST - Coni LUNSFORD, Susan DE LA GARZA  auto-populated from documented surgical case  C SEC X 2.  Total abdominal hysterectomy (corpus and cervix), with or without removal of tube(s), with or without removal of ovary(s); (09330).  Cholecystectomy (82606823).  Colonoscopy ([id number]).  Tonsillectomy with adenoidectomy (04376069)., Reviewed as documented in chart.   Family history:    History is unknown., Reviewed as documented in chart.   Social history: Reviewed as documented in chart.   Problem list: Per nurse's notes.      Physical Examination               Vital Signs   Vital Signs   2/9/2020 22:56 CST       Temperature Oral          36.7 DegC                             Temperature Oral (calculated)             98.06 DegF                             Peripheral Pulse Rate     89 bpm                             Respiratory Rate          18 br/min                             SpO2                      95 %                             Oxygen Therapy            Room air                             Systolic Blood Pressure   97 mmHg                             Diastolic Blood Pressure  59 mmHg  LOW  .   Measurements   2/9/2020 22:56 CST       Weight Dosing             43 kg                             Weight Measured and Calculated in Lbs     94.80 lb                              Weight Estimated          43 kg                             Height/Length Dosing      152 cm                             Height/Length Estimated   152 cm                             Body Mass Index Estimated 18.61 kg/m2  .   General:  Alert, no acute distress, well hydrated, Skin: Normal for ethnicity.    Skin:  Warm, dry, pink, intact.    Head:  Normocephalic.   Neck:  Supple, no tenderness, full range of motion.    Eye:  Pupils are equal, round and reactive to light, extraocular movements are intact, normal conjunctiva.    Cardiovascular:  Regular rate and rhythm, No murmur, Normal peripheral perfusion.    Respiratory:  Lungs are clear to auscultation, breath sounds are equal, Respirations: not tachypneic, not labored, not shallow, Retractions: None.    Chest wall:  No tenderness.   Back:  Normal range of motion, Normal alignment, No costovertebral angle tenderness,    Musculoskeletal:  Normal ROM, normal strength, no swelling, no deformity, Fingers/toes: Left, second, tenderness, swelling, erythema, Tenderness to tendon extending into hand.  Small pustule was noted to palmar side of finger. .    Gastrointestinal:  Soft, Nontender, Non distended, Normal bowel sounds.    Neurological:  Alert and oriented to person, place, time, and situation, No focal neurological deficit observed, normal sensory observed, normal motor observed, normal speech observed, normal coordination observed, Gait: Normal.    Psychiatric:  Cooperative, appropriate mood & affect, normal judgment.       Medical Decision Making   Documents reviewed:  Emergency department nurses' notes.   Orders  Launch Orders   Laboratory:  CMP (Order): Stat collect, 2/10/2020 0:44 CST, Blood, Lab Collect, Print Label By Order Location, 2/10/2020 0:44 CST  CBC - includes Diff (Order): Stat collect, 2/10/2020 0:44 CST, Blood, Lab Collect, Print Label By Order Location, 2/10/2020 0:44 CST  Pharmacy:  Vancocin 1.25 gm/D5W 250 mL (Order): 1,250 mg, form:  Injection, IV Piggyback, [id number], first dose 2/10/2020 0:45 CST, STAT  Radiology:  XR Finger Second Digit Hand Left MIN 2 Views (Order): Stat, 2/10/2020 0:45 CST, Swelling, None, Patient Bed, Patient Has IV?, Rad Type, Not Scheduled, Launch Orders   Laboratory:  Culture Blood (Order): 2/10/2020 0:48 CST, Stat collect, Blood, Print Label By Order Location  Culture Blood (Order): 2/10/2020 0:48 CST, Stat collect, Blood, Print Label By Order Location, Launch Orders   Pharmacy:  Rocephin 1 g injection (Order): 1 gm, form: Injection, IV, Once, first dose 2/10/2020 0:58 CST, stop date 2/10/2020 0:58 CST, STAT.    Hand/finger x-ray findings:  Time reported 2/10/2020 01:07:00.  normal alignment.  no fracture.  interpretation by Emergency Physician.  soft tissue swelling.        Procedure   Procedure notes:      Impression and Plan   Diagnosis   Tenosynovitis of fingers ([id number]-JH M65.9)      Calls-Consults   -  2/10/2020 00:47:00 , Chely MARTELL, Sree PATRICK, recommends consult, abx, admit to medicine .    -  2/10/2020 00:54:00 , Jeremie Duarte MD, recommends Dr Melara accepts for Dr Chao.    Plan   Condition: Stable.    Disposition: Admit time  2/10/2020 00:53:00, Place in Observation Unit.    Counseled: Patient, Regarding diagnosis, Regarding diagnostic results, Regarding treatment plan, Patient indicated understanding of instructions.    Orders: Launch Orders   Admit/Transfer/Discharge:  Place in Outpatient Observation (Order): 2/10/2020 1:06 CST, Medical Unit Jeremie Duarte MD, No.    Notes: Admitted in collaboration with Dr. Augustin.

## 2022-04-30 NOTE — OP NOTE
DATE OF SURGERY:    02/10/2020    SURGEON:  Niall Bhakta MD  ASSISTANT:  Mari Herrmann    Ms Herrmann was essential in exposure, I and D and closure.    PREOPERATIVE DIAGNOSIS:  Abscess of the left index finger.    POSTOPERATIVE DIAGNOSIS:  Abscess of the left index finger.    PROCEDURE:  Irrigation and debridement left index finger abscess.    INDICATION FOR SURGERY:  Ms. Rodriguez is a 54-year-old female.  She, a few days ago, had swelling of her finger.  She has obvious pus on the left index finger over the PIP joint.  It does not track into her palm or into her forearm.  Risks, benefits, and alternatives to surgery were discussed with the patient.  I recommended irrigation and debridement of the pus.  She voiced understanding and elected to proceed.    DESCRIPTION OF PROCEDURE:  The patient was taken to the operating room.  General endotracheal anesthesia was administered.  The left hand was prescrubbed with Hibiclens, prepped and draped in standard sterile fashion.  Antibiotics were dosed.  Timeout was performed.  No tourniquet was used.  I used a standard Brunner type incision from the level of the DIP to the PIP and then to the MCP.  We found immediate pus, which we cultured and sent to the lab.  I then exposed the tendon sheath and washed thoroughly with greater than 3 L of saline.  After this, I did expose the tendon sheath, and there was no pus within the tendon sheath itself.  Everything was copiously washed, and then the skin was repaired with 3-0 nylon in a simple fashion.  Xeroform, 4 x 4 gauze, Webril, and an Ace wrap were placed.  The patient was awakened and taken to PACU in stable condition.        ______________________________  MD TEN Mckeon/ALIDA  DD:  02/10/2020  Time:  10:35AM  DT:  02/10/2020  Time:  10:48AM  Job #:  [id number]

## 2022-04-30 NOTE — DISCHARGE SUMMARY
Patient:   Mariana Rodriguez            MRN: [id number]            FIN: [id number]               Age:   52 years     Sex:  Female     :  1965   Associated Diagnoses:   None   Author:   Rico Persaud DO      Patient is hemodynamically and clinically stable on reevaluation. Labs were remarkable for hypokalemia with a potassium of 3.1 on admission and was treated accordingly.   Patient complained of dysuria and urinalysis was positive for a UTI. Thus, patient was started on Macrobid 100mg BID and should continue antibiotic for 5 days total.   Patient also complained of nasal congestion.  Flonase was ordered.  Tyelenol level was 47.5 on admission. Two repeat levels have been normal:  3.1 and 16.8. Patient is medically clear for psychiatric placement.  Continue patient on antibiotic as prescribed.      At 1402, patient has been accepted for placement at [facility].  The accepting physician is Dr. Dee. Patient was transferred in stable condition.

## 2022-12-16 ENCOUNTER — HOSPITAL ENCOUNTER (EMERGENCY)
Facility: HOSPITAL | Age: 57
Discharge: HOME | End: 2022-12-16
Attending: EMERGENCY MEDICINE
Payer: MEDICAID

## 2022-12-16 VITALS
WEIGHT: 110.25 LBS | DIASTOLIC BLOOD PRESSURE: 69 MMHG | HEART RATE: 85 BPM | BODY MASS INDEX: 21.65 KG/M2 | TEMPERATURE: 98 F | HEIGHT: 60 IN | OXYGEN SATURATION: 99 % | RESPIRATION RATE: 16 BRPM | SYSTOLIC BLOOD PRESSURE: 106 MMHG

## 2022-12-16 DIAGNOSIS — L03.116 CELLULITIS OF LEFT LOWER EXTREMITY: Primary | ICD-10-CM

## 2022-12-16 PROCEDURE — 63600175 PHARM REV CODE 636 W HCPCS: Performed by: EMERGENCY MEDICINE

## 2022-12-16 PROCEDURE — 90714 TD VACC NO PRESV 7 YRS+ IM: CPT | Performed by: EMERGENCY MEDICINE

## 2022-12-16 PROCEDURE — 99283 EMERGENCY DEPT VISIT LOW MDM: CPT

## 2022-12-16 PROCEDURE — 90471 IMMUNIZATION ADMIN: CPT | Performed by: EMERGENCY MEDICINE

## 2022-12-16 PROCEDURE — 25000003 PHARM REV CODE 250: Performed by: EMERGENCY MEDICINE

## 2022-12-16 RX ORDER — SULFAMETHOXAZOLE AND TRIMETHOPRIM 800; 160 MG/1; MG/1
1 TABLET ORAL 2 TIMES DAILY
Qty: 14 TABLET | Refills: 0 | Status: SHIPPED | OUTPATIENT
Start: 2022-12-16 | End: 2022-12-23

## 2022-12-16 RX ORDER — SULFAMETHOXAZOLE AND TRIMETHOPRIM 800; 160 MG/1; MG/1
1 TABLET ORAL
Status: COMPLETED | OUTPATIENT
Start: 2022-12-16 | End: 2022-12-16

## 2022-12-16 RX ORDER — HYDROCODONE BITARTRATE AND ACETAMINOPHEN 5; 325 MG/1; MG/1
1 TABLET ORAL
Status: COMPLETED | OUTPATIENT
Start: 2022-12-16 | End: 2022-12-16

## 2022-12-16 RX ADMIN — SULFAMETHOXAZOLE AND TRIMETHOPRIM 1 TABLET: 800; 160 TABLET ORAL at 02:12

## 2022-12-16 RX ADMIN — HYDROCODONE BITARTRATE AND ACETAMINOPHEN 1 TABLET: 5; 325 TABLET ORAL at 02:12

## 2022-12-16 RX ADMIN — CLOSTRIDIUM TETANI TOXOID ANTIGEN (FORMALDEHYDE INACTIVATED) AND CORYNEBACTERIUM DIPHTHERIAE TOXOID ANTIGEN (FORMALDEHYDE INACTIVATED) 0.5 ML: 5; 2 INJECTION, SUSPENSION INTRAMUSCULAR at 02:12

## 2022-12-16 NOTE — ED PROVIDER NOTES
Encounter Date: 12/16/2022       History     Chief Complaint   Patient presents with    Wound Infection     C/o insect bite on left foot since Tuesday. Redness and swelling noted.      Mrs. Mariana Rodriguez is a 56 yo female who presents with chief complaint skin infection. Onset 5 days ago when she noticed a small red raised tender area to her left foot that has been constant, progressively worsening. Associated symptoms of swelling and pain that is aggravated with movement and weightbearing, minimally improved with rest, elevation, and ibuprofen. She reports that a couple of days ago she opened it up with a sharp object and it drained some purulent fluid. Denies IV drug use, fevers, numbness or tingling.    The history is provided by the patient.   Review of patient's allergies indicates:   Allergen Reactions    Ciprofloxacin Swelling    Toradol [ketorolac] Hives     History reviewed. No pertinent past medical history.  History reviewed. No pertinent surgical history.  History reviewed. No pertinent family history.  Social History     Tobacco Use    Smoking status: Every Day     Packs/day: 0.50     Types: Cigarettes    Smokeless tobacco: Never   Substance Use Topics    Alcohol use: Not Currently    Drug use: Never     Review of Systems   Musculoskeletal:  Positive for gait problem and myalgias (foot pain).   Skin:  Positive for color change and wound.   All other systems reviewed and are negative.    Physical Exam     Initial Vitals [12/16/22 1338]   BP Pulse Resp Temp SpO2   106/69 85 20 97.9 °F (36.6 °C) 99 %      MAP       --         Physical Exam    Nursing note and vitals reviewed.  Constitutional: She appears well-developed and well-nourished.   HENT:   Head: Normocephalic and atraumatic.   Eyes: EOM are normal. Pupils are equal, round, and reactive to light.   Neck: Neck supple.   Cardiovascular:  Normal rate and regular rhythm.           Pulmonary/Chest: Breath sounds normal.   Abdominal: Abdomen is soft.    Musculoskeletal:         General: Tenderness and edema present. Normal range of motion.      Cervical back: Neck supple.     Neurological: She is alert and oriented to person, place, and time.   Skin: Skin is warm and dry. There is erythema.        Localized area of swelling, erythema, and warmth with tenderness to palpation left dorsal medial foot, no fluctuance   Psychiatric: She has a normal mood and affect.       ED Course   Procedures  Labs Reviewed - No data to display       Imaging Results    None          Medications   tetanus-diphther toxoids vaccine (PF) (TENIVAC) injection (0.5 mLs Intramuscular Given 12/16/22 1445)   HYDROcodone-acetaminophen 5-325 mg per tablet 1 tablet (1 tablet Oral Given 12/16/22 1445)   sulfamethoxazole-trimethoprim 800-160mg per tablet 1 tablet (1 tablet Oral Given 12/16/22 1444)     Medical Decision Making:   Initial Assessment:   Well appearing 56 yo female presents with cellulitis to left foot. No evidence for abscess. No evidence of compartment syndrome. Do not suspect deep space infection. Will empirically treat with Bactrim and instructed on wound care with instructions to return to ED if not improving after 72 hours or sooner is new or worsening symptoms.                        Clinical Impression:   Final diagnoses:  [L03.116] Cellulitis of left lower extremity (Primary)        ED Disposition Condition    Discharge Stable          ED Prescriptions       Medication Sig Dispense Start Date End Date Auth. Provider    sulfamethoxazole-trimethoprim 800-160mg (BACTRIM DS) 800-160 mg Tab Take 1 tablet by mouth 2 (two) times daily. for 7 days 14 tablet 12/16/2022 12/23/2022 Alex Neal DO          Follow-up Information       Follow up With Specialties Details Why Contact Info Additional Information    [facility] Internal Medicine   [facility] [address] [phone] Internal Medicine Clinic Entrance #1              Alex Neal, DO  12/16/22 1459

## 2023-09-15 ENCOUNTER — HOSPITAL ENCOUNTER (EMERGENCY)
Facility: HOSPITAL | Age: 58
Discharge: HOME | End: 2023-09-15
Attending: STUDENT IN AN ORGANIZED HEALTH CARE EDUCATION/TRAINING PROGRAM
Payer: MEDICAID

## 2023-09-15 VITALS
BODY MASS INDEX: 21.6 KG/M2 | SYSTOLIC BLOOD PRESSURE: 114 MMHG | RESPIRATION RATE: 18 BRPM | HEIGHT: 60 IN | HEART RATE: 86 BPM | WEIGHT: 110 LBS | DIASTOLIC BLOOD PRESSURE: 86 MMHG | TEMPERATURE: 98 F | OXYGEN SATURATION: 99 %

## 2023-09-15 DIAGNOSIS — M62.830 LUMBAR PARASPINAL MUSCLE SPASM: Primary | ICD-10-CM

## 2023-09-15 LAB
APPEARANCE UR: CLEAR
BILIRUB UR QL STRIP.AUTO: NEGATIVE
COLOR UR: NORMAL
GLUCOSE UR QL STRIP.AUTO: NEGATIVE
KETONES UR QL STRIP.AUTO: NEGATIVE
LEUKOCYTE ESTERASE UR QL STRIP.AUTO: NEGATIVE
NITRITE UR QL STRIP.AUTO: NEGATIVE
PH UR STRIP.AUTO: 6 [PH]
PROT UR QL STRIP.AUTO: NEGATIVE
RBC UR QL AUTO: NEGATIVE
SP GR UR STRIP.AUTO: 1.02 (ref 1–1.03)
UROBILINOGEN UR STRIP-ACNC: 1

## 2023-09-15 PROCEDURE — 81003 URINALYSIS AUTO W/O SCOPE: CPT | Performed by: STUDENT IN AN ORGANIZED HEALTH CARE EDUCATION/TRAINING PROGRAM

## 2023-09-15 PROCEDURE — 99283 EMERGENCY DEPT VISIT LOW MDM: CPT

## 2023-09-15 RX ORDER — CYCLOBENZAPRINE HCL 5 MG
5 TABLET ORAL 3 TIMES DAILY PRN
Qty: 10 TABLET | Refills: 0 | Status: SHIPPED | OUTPATIENT
Start: 2023-09-15 | End: 2023-09-25

## 2023-09-16 NOTE — ED PROVIDER NOTES
Encounter Date: 9/15/2023       History     Chief Complaint   Patient presents with    Back Pain     Stabbing pain on left side; began yesterday;      HPI    57-year-old female with no stated past medical history presents emergency department for left lower back pain.  Patient states it started yesterday.  States he grabs her when she twists his.  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  No fall or trauma.  No leg numbness or weakness.  No issues with burning on urination, blood in the urine or bladder or bowel incontinence.  No fevers.  States she took some Tylenol with mild relief.  Worsens if she twists.      Review of patient's allergies indicates:   Allergen Reactions    Ciprofloxacin Swelling    Toradol [ketorolac] Hives     History reviewed. No pertinent past medical history.  No past surgical history on file.  History reviewed. No pertinent family history.  Social History     Tobacco Use    Smoking status: Every Day     Current packs/day: 0.50     Types: Cigarettes    Smokeless tobacco: Never   Substance Use Topics    Alcohol use: Not Currently    Drug use: Never     Review of Systems   Constitutional:  Negative for fever.   HENT:  Negative for sore throat.    Respiratory:  Negative for shortness of breath.    Cardiovascular:  Negative for chest pain.   Gastrointestinal:  Negative for nausea.   Genitourinary:  Negative for dysuria.   Musculoskeletal:  Positive for back pain.   Skin:  Negative for rash.   Neurological:  Negative for weakness.   Hematological:  Does not bruise/bleed easily.   All other systems reviewed and are negative.      Physical Exam     Initial Vitals [09/15/23 1946]   BP Pulse Resp Temp SpO2   114/86 86 18 98 °F (36.7 °C) 99 %      MAP       --         Physical Exam    Nursing note and vitals reviewed.  Constitutional: She appears well-developed and well-nourished. No distress.   Cardiovascular:  Normal rate and regular rhythm.           Pulmonary/Chest: Breath  sounds normal. No respiratory distress. She has no wheezes. She has no rhonchi. She has no rales.   Abdominal: Abdomen is soft. There is no abdominal tenderness. There is no rebound and no guarding.   Musculoskeletal:         General: Tenderness (Tenderness to the left lumbar paraspinal musculature with palpation reproducing pain complaint) present. Normal range of motion.      Comments: No midline C/T/L-spine tenderness.  No step-off.  No bony point tenderness.     Neurological: She is alert and oriented to person, place, and time. She has normal strength.   Skin: Skin is warm. Capillary refill takes less than 2 seconds.         ED Course   Procedures  Labs Reviewed   URINALYSIS, REFLEX TO URINE CULTURE - Normal          Imaging Results    None          Medications - No data to display  Medical Decision Making  differential diagnosis  Muscle spasm, contusion, back pain, UTI/pyelonephritis, urolithiasis, AAA,  as well as multiple other possible etiologies      Patient with no red flags.  No abdominal tenderness.  Patient does smoke possibility of AAA.  Informed patient that her pain is mostly musculoskeletal.  No abdominal pain.  Extremely low probability of AAA.  Patient does not want any further workup other than a urinalysis.  Will treat symptomatically with some muscle relaxers.  Patient agrees to plan    Amount and/or Complexity of Data Reviewed  Labs: ordered. Decision-making details documented in ED Course.               ED Course as of 09/15/23 2022   Fri Sep 15, 2023   2019 Leukocytes, UA: Negative [BS]   2019 NITRITE UA: Negative [BS]      ED Course User Index  [BS] Spenser Plummer MD                    Clinical Impression:   Final diagnoses:  [M62.830] Lumbar paraspinal muscle spasm (Primary)        ED Disposition Condition    Discharge Stable          ED Prescriptions       Medication Sig Dispense Start Date End Date Auth. Provider    cyclobenzaprine (FLEXERIL) 5 MG tablet Take 1 tablet (5 mg total)  by mouth 3 (three) times daily as needed. 10 tablet 9/15/2023 9/25/2023 Spenser Plummer MD          Follow-up Information       Follow up With Specialties Details Why Contact Info    Yue Reyes MD Internal Medicine Schedule an appointment as soon as possible for a visit   [address] [place] [id number]  [phone]      [facility] - Emergency Dept Emergency Medicine Go to  If symptoms worsen [id number] [facility] [id number] [phone]             Spenser Plummer MD  09/15/23 2022

## 2024-11-08 ENCOUNTER — HOSPITAL ENCOUNTER (EMERGENCY)
Facility: HOSPITAL | Age: 59
Discharge: HOME | End: 2024-11-08
Attending: STUDENT IN AN ORGANIZED HEALTH CARE EDUCATION/TRAINING PROGRAM
Payer: COMMERCIAL

## 2024-11-08 VITALS
SYSTOLIC BLOOD PRESSURE: 111 MMHG | OXYGEN SATURATION: 98 % | DIASTOLIC BLOOD PRESSURE: 70 MMHG | WEIGHT: 111 LBS | HEIGHT: 60 IN | HEART RATE: 79 BPM | BODY MASS INDEX: 21.79 KG/M2 | TEMPERATURE: 98 F | RESPIRATION RATE: 20 BRPM

## 2024-11-08 DIAGNOSIS — Z04.1: ICD-10-CM
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
## 2024-11-08 LAB
ALBUMIN SERPL-MCNC: 4.3 G/DL (ref 3.5–5)
ALBUMIN/GLOB SERPL: 1.5 RATIO (ref 1.1–2)
ALP SERPL-CCNC: 82 UNIT/L (ref 40–150)
ALT SERPL-CCNC: 22 UNIT/L (ref 0–55)
AMPHET UR QL SCN: POSITIVE
ANION GAP SERPL CALC-SCNC: 9 MEQ/L
APTT PPP: 28 SECONDS (ref 23.4–33.9)
AST SERPL-CCNC: 20 UNIT/L (ref 5–34)
BARBITURATE SCN PRESENT UR: NEGATIVE
BASOPHILS # BLD AUTO: 0 X10(3)/MCL
BASOPHILS NFR BLD AUTO: 0 %
BENZODIAZ UR QL SCN: POSITIVE
BILIRUB SERPL-MCNC: 0.6 MG/DL
BILIRUB UR QL STRIP.AUTO: NEGATIVE
BUN SERPL-MCNC: 15.2 MG/DL (ref 9.8–20.1)
CALCIUM SERPL-MCNC: 9.7 MG/DL (ref 8.4–10.2)
CANNABINOIDS UR QL SCN: POSITIVE
CHLORIDE SERPL-SCNC: 105 MMOL/L (ref 98–107)
CLARITY UR: CLEAR
CO2 SERPL-SCNC: 28 MMOL/L (ref 22–29)
COCAINE UR QL SCN: NEGATIVE
COLOR UR AUTO: NORMAL
CREAT SERPL-MCNC: 0.86 MG/DL (ref 0.55–1.02)
CREAT/UREA NIT SERPL: 18
EOSINOPHIL # BLD AUTO: 0 X10(3)/MCL (ref 0–0.9)
EOSINOPHIL NFR BLD AUTO: 0 %
ERYTHROCYTE [DISTWIDTH] IN BLOOD BY AUTOMATED COUNT: 12.4 % (ref 11.5–17)
ETHANOL SERPL-MCNC: <10 MG/DL
FENTANYL UR QL SCN: NEGATIVE
GFR SERPLBLD CREATININE-BSD FMLA CKD-EPI: >60 ML/MIN/1.73/M2
GLOBULIN SER-MCNC: 2.8 GM/DL (ref 2.4–3.5)
GLUCOSE SERPL-MCNC: 118 MG/DL (ref 74–100)
GLUCOSE UR QL STRIP: NEGATIVE
GROUP & RH: NORMAL
HCT VFR BLD AUTO: 37.4 % (ref 37–47)
HGB BLD-MCNC: 12.9 G/DL (ref 12–16)
HGB UR QL STRIP: NEGATIVE
IMM GRANULOCYTES # BLD AUTO: 0.01 X10(3)/MCL (ref 0–0.04)
IMM GRANULOCYTES NFR BLD AUTO: 0.2 %
INDIRECT COOMBS: NORMAL
INR PPP: 1 (ref 2–3)
KETONES UR QL STRIP: NEGATIVE
LACTATE SERPL-SCNC: 1.1 MMOL/L (ref 0.5–2.2)
LEUKOCYTE ESTERASE UR QL STRIP: NEGATIVE
LYMPHOCYTES # BLD AUTO: 1.81 X10(3)/MCL (ref 0.6–4.6)
LYMPHOCYTES NFR BLD AUTO: 39.7 %
MCH RBC QN AUTO: 31.5 PG (ref 27–31)
MCHC RBC AUTO-ENTMCNC: 34.5 G/DL (ref 33–36)
MCV RBC AUTO: 91.2 FL (ref 80–94)
MDMA UR QL SCN: NEGATIVE
MONOCYTES # BLD AUTO: 0.35 X10(3)/MCL (ref 0.1–1.3)
MONOCYTES NFR BLD AUTO: 7.7 %
NEUTROPHILS # BLD AUTO: 2.39 X10(3)/MCL (ref 2.1–9.2)
NEUTROPHILS NFR BLD AUTO: 52.4 %
NITRITE UR QL STRIP: NEGATIVE
NRBC BLD AUTO-RTO: 0 %
OPIATES UR QL SCN: NEGATIVE
PCP UR QL: NEGATIVE
PH UR STRIP: 6.5 [PH]
PH UR: 6.5 [PH] (ref 3–11)
PLATELET # BLD AUTO: 231 X10(3)/MCL (ref 130–400)
PMV BLD AUTO: 11 FL (ref 7.4–10.4)
POTASSIUM SERPL-SCNC: 4.5 MMOL/L (ref 3.5–5.1)
PROT SERPL-MCNC: 7.1 GM/DL (ref 6.4–8.3)
PROT UR QL STRIP: NEGATIVE
PROTHROMBIN TIME: 13.2 SECONDS (ref 11.7–14.5)
RBC # BLD AUTO: 4.1 X10(6)/MCL (ref 4.2–5.4)
SODIUM SERPL-SCNC: 142 MMOL/L (ref 136–145)
SP GR UR STRIP.AUTO: <=1.005 (ref 1–1.03)
SPECIFIC GRAVITY, URINE AUTO (.000) (OHS): <=1.005 (ref 1–1.03)
SPECIMEN OUTDATE: NORMAL
UROBILINOGEN UR STRIP-ACNC: 0.2
WBC # BLD AUTO: 4.56 X10(3)/MCL (ref 4.5–11.5)

## 2024-11-08 PROCEDURE — 83605 ASSAY OF LACTIC ACID: CPT | Performed by: STUDENT IN AN ORGANIZED HEALTH CARE EDUCATION/TRAINING PROGRAM

## 2024-11-08 PROCEDURE — 80307 DRUG TEST PRSMV CHEM ANLYZR: CPT | Performed by: STUDENT IN AN ORGANIZED HEALTH CARE EDUCATION/TRAINING PROGRAM

## 2024-11-08 PROCEDURE — 25500020 PHARM REV CODE 255: Performed by: STUDENT IN AN ORGANIZED HEALTH CARE EDUCATION/TRAINING PROGRAM

## 2024-11-08 PROCEDURE — 80053 COMPREHEN METABOLIC PANEL: CPT | Performed by: STUDENT IN AN ORGANIZED HEALTH CARE EDUCATION/TRAINING PROGRAM

## 2024-11-08 PROCEDURE — 81003 URINALYSIS AUTO W/O SCOPE: CPT | Performed by: STUDENT IN AN ORGANIZED HEALTH CARE EDUCATION/TRAINING PROGRAM

## 2024-11-08 PROCEDURE — 85730 THROMBOPLASTIN TIME PARTIAL: CPT | Performed by: STUDENT IN AN ORGANIZED HEALTH CARE EDUCATION/TRAINING PROGRAM

## 2024-11-08 PROCEDURE — 85610 PROTHROMBIN TIME: CPT | Performed by: STUDENT IN AN ORGANIZED HEALTH CARE EDUCATION/TRAINING PROGRAM

## 2024-11-08 PROCEDURE — 86850 RBC ANTIBODY SCREEN: CPT | Performed by: STUDENT IN AN ORGANIZED HEALTH CARE EDUCATION/TRAINING PROGRAM

## 2024-11-08 PROCEDURE — 99285 EMERGENCY DEPT VISIT HI MDM: CPT | Mod: 25

## 2024-11-08 PROCEDURE — 82077 ASSAY SPEC XCP UR&BREATH IA: CPT | Performed by: STUDENT IN AN ORGANIZED HEALTH CARE EDUCATION/TRAINING PROGRAM

## 2024-11-08 PROCEDURE — 85025 COMPLETE CBC W/AUTO DIFF WBC: CPT | Performed by: STUDENT IN AN ORGANIZED HEALTH CARE EDUCATION/TRAINING PROGRAM

## 2024-11-08 RX ADMIN — IOHEXOL 100 ML: 350 INJECTION, SOLUTION INTRAVENOUS at 12:11
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
